# Patient Record
Sex: FEMALE | Race: BLACK OR AFRICAN AMERICAN | NOT HISPANIC OR LATINO
[De-identification: names, ages, dates, MRNs, and addresses within clinical notes are randomized per-mention and may not be internally consistent; named-entity substitution may affect disease eponyms.]

---

## 2017-01-21 ENCOUNTER — HOSPITAL (OUTPATIENT)
Dept: OTHER | Age: 32
End: 2017-01-21
Attending: NURSE PRACTITIONER

## 2017-01-21 LAB
C1-ESTERASE INHIBITOR (C1EINH): 28
C3 SERPL-MCNC: 164 MG/DL (ref 79–152)
C4 SERPL-MCNC: 40.1 MG/DL (ref 16–38)
ERYTHROCYTE [SEDIMENTATION RATE] IN BLOOD BY WESTERGREN METHOD: 46 MM/HR (ref 0–20)
TRYPTASE SERPL-MCNC: 3.5 NG/ML

## 2018-05-14 PROBLEM — O09.299 HISTORY OF FETAL ANOMALY IN PRIOR PREGNANCY, CURRENTLY PREGNANT (HCC): Status: ACTIVE | Noted: 2018-05-14

## 2018-10-23 PROBLEM — O99.820 GROUP B STREPTOCOCCUS CARRIER, +RV CULTURE, CURRENTLY PREGNANT (HCC): Status: ACTIVE | Noted: 2018-10-23

## 2018-11-19 PROBLEM — Z34.90 PREGNANCY (HCC): Status: ACTIVE | Noted: 2018-11-19

## 2018-11-20 PROBLEM — Z98.891 STATUS POST CESAREAN DELIVERY: Status: ACTIVE | Noted: 2018-11-20

## 2018-11-20 NOTE — ANESTHESIA PREPROCEDURE EVALUATION
PRE-OP EVALUATION    Patient Name: Himanshu Dwyer    Pre-op Diagnosis: * No pre-op diagnosis entered *    Procedure(s):      Surgeon(s) and Role:     Jackson Frankel MD - Primary    Pre-op vitals reviewed.   Temp: 99.2 °F (37.3 °C)  Pulse: 77  Resp: 18  BP: 119 (XYZAL ALLERGY 24HR) 5 MG Oral Tab Take 5 mg by mouth every evening. Disp:  Rfl:    Prenat w/o R-DL-Qccvyeo-FA-DHA (PNV-DHA OR) Take 1 tablet by mouth. Disp:  Rfl:        Allergies: Patient has no known allergies.       Anesthesia Evaluation    Patient summ

## 2018-11-20 NOTE — PROGRESS NOTES
Pt is a 29year old female admitted to 109/109-A. Patient presents with:  Scheduled Induction     Pt is  40w6d intra-uterine pregnancy. History obtained, consents signed. Oriented to room, staff, and plan of care.

## 2018-11-20 NOTE — PLAN OF CARE
Problem: Patient/Family Goals  Goal: Patient/Family Long Term Goal  Patient's Long Term Goal: Safe and uncomfortable.     Interventions:  - See additional Care Plan goals for specific interventions   Outcome: Progressing    Goal: Patient/Family Short Term G

## 2018-11-20 NOTE — ANESTHESIA POSTPROCEDURE EVALUATION
[address] Patient Status:  Inpatient   Age/Gender 29year old female MRN [id number]   Location [address] [facility] Attending Areli Mccullough MD   Hosp Day # 1 PCP No primary care provider on file.        Anesthesia Post-op

## 2018-11-20 NOTE — PROGRESS NOTES
Labor Progress Note    No complaints. Reports low back pain.   Temp:  [97.5 °F (36.4 °C)-99.8 °F (37.7 °C)] 99.2 °F (37.3 °C)  Pulse:  [] 80  Resp:  [18] 18  BP: (108-144)/(55-83) 121/64  FHT:  baseline 140s, moderate variability, accels appreciated,

## 2018-11-20 NOTE — OPERATIVE REPORT
Operative Note    Preop diagnosis: 41w0d     Failure to progress  Postop diagnosis: Same  Procedure:  Primary low transverse  section  Surgeon:  Karina Rose  Assistant:  Geeta Carter  Anesthesia:  Epidural  Findings:  Male infant weighing 9#2 with Apgars 8 an The uterine incision was closed with #1 chromic in a running-locking fashion. A second layer was imbricated using #1 chromic. Additional figure of eight sutures were placed for hemostasis. Good hemostasis was confirmed at the level of the uterus.  The pelvi

## 2018-11-20 NOTE — H&P
OB H&P    34yo  at 40w6d admitted for IOL. She denies contractions, no lof, no vb, +FM. Prenatal course complicated by 81BI D&E for multiple fetal anomalies. GBS is positive. She has had low risk cffDNA and normal level 2 US this pregnancy. in AM  Start antibiotics for GBS prophylaxis in labor  FWBR  Anticipate     [facility]

## 2018-11-20 NOTE — PROGRESS NOTES
[facility]      Labor Progress Note    Jessica Palomares Patient Status:  Inpatient    1985 MRN [id number]   Location 1818 [facility] Attending Kathy Morelos MD   Hosp Day # 1 PCP No primary care provider on file.       SUBJECTIVE:

## 2018-11-20 NOTE — PROGRESS NOTES
Labor Progress Note    No new complaints.   Temp:  [97.5 °F (36.4 °C)-99.8 °F (37.7 °C)] 99.2 °F (37.3 °C)  Pulse:  [] 77  Resp:  [18] 18  BP: (108-144)/(55-83) 119/57  FHT:  baseline 140s, moderate variability, accels appreciated, variable decels not

## 2018-11-20 NOTE — PROGRESS NOTES
Addendum:    Pt with temp of 102.4 in recovery room. Will send placenta. Treat for chorio with amp/gent/clinda.

## 2018-11-20 NOTE — PROGRESS NOTES
[facility]      Labor Progress Note    Moris Eric Patient Status:  Inpatient    1985 MRN [id number]   Location 1818 [facility] Attending Red Bradley MD   Hosp Day # 1 PCP No primary care provider on file.       SUBJECTIVE:

## 2018-11-21 NOTE — PROGRESS NOTES
Postpartum Progress Note    SUBJECTIVE:    Post-op day #1 s/p primary  section. Antibiotics for chorio. No overnight events. No complaints. Has been out of bed, tolerating PO, delacruz in place, + flatus. Breastfeeding.       OBJECTIVE:    Vital

## 2018-11-21 NOTE — PROGRESS NOTES
[facility]    Patients Name: Himanshu Dwyer  Attending Physician: Ish Mccoy MD  CSN: [id number]    Location:  1116/1116-A  MRN: [id number]    YOB: 1985  Admission Date: 11/19/2018     Obstetric Anesthesia Pain Progress Note    Post-Op

## 2018-11-21 NOTE — PROGRESS NOTES
NURSING ADMISSION NOTE      Patient admitted via Cart  Oriented to room. Safety precautions initiated. Bed in low position. Call light in reach.  Stable, baby with mom , ID bands match, Hug and kiss in place

## 2018-11-22 NOTE — PROGRESS NOTES
Postop Day 2    Pt without complaints.      Temp: 98.7 °F (37.1 °C)  Pulse: 97  Resp: 20  BP: 125/66  abd  soft, NT, ND, fundus firm below umbilicus, incision C/D/I  extr  trace edema, no calf tenderness    Impression: POD#2  Plan:  Tmax 99 last night, cont

## 2018-11-23 NOTE — PROGRESS NOTES
Patient complaints: none. Feels well. Vital signs reviewed    Examination:  General: alert, appropriate affect  Abdomen: Fundus firm and no unexpected tenderness.   Remainder of abdomen unremarkable  Incision: dry, intact, no unexpected findings  Lochia

## 2018-11-23 NOTE — DISCHARGE SUMMARY
Reason for Admission: pregnancy      Hospital Course:   followed by uncomplicated postop course    Complications: none    Disposition: Home or Self Care    Discharge Condition: Good    Discharge Medications: norco, motrin    Follow up Visits:  Yusef Duran

## 2024-02-28 ENCOUNTER — LAB ENCOUNTER (OUTPATIENT)
Dept: LAB | Facility: HOSPITAL | Age: 39
End: 2024-02-28
Attending: INTERNAL MEDICINE
Payer: COMMERCIAL

## 2024-02-28 DIAGNOSIS — R10.84 ABDOMINAL DISCOMFORT, GENERALIZED: ICD-10-CM

## 2024-02-28 DIAGNOSIS — D64.9 ANEMIA, UNSPECIFIED TYPE: ICD-10-CM

## 2024-02-28 DIAGNOSIS — R19.4 ALTERED BOWEL HABITS: ICD-10-CM

## 2024-02-28 LAB
ALBUMIN SERPL-MCNC: 4.1 G/DL (ref 3.4–5)
ALBUMIN/GLOB SERPL: 1 {RATIO} (ref 1–2)
ALP LIVER SERPL-CCNC: 61 U/L
ALT SERPL-CCNC: 14 U/L
ANION GAP SERPL CALC-SCNC: 4 MMOL/L (ref 0–18)
AST SERPL-CCNC: 14 U/L (ref 15–37)
BASOPHILS # BLD AUTO: 0.04 X10(3) UL (ref 0–0.2)
BASOPHILS NFR BLD AUTO: 0.4 %
BILIRUB SERPL-MCNC: 0.5 MG/DL (ref 0.1–2)
BUN BLD-MCNC: 8 MG/DL (ref 9–23)
CALCIUM BLD-MCNC: 9.4 MG/DL (ref 8.5–10.1)
CHLORIDE SERPL-SCNC: 105 MMOL/L (ref 98–112)
CO2 SERPL-SCNC: 27 MMOL/L (ref 21–32)
CREAT BLD-MCNC: 0.99 MG/DL
CRP SERPL-MCNC: 0.88 MG/DL (ref ?–0.3)
EGFRCR SERPLBLD CKD-EPI 2021: 75 ML/MIN/1.73M2 (ref 60–?)
EOSINOPHIL # BLD AUTO: 0.12 X10(3) UL (ref 0–0.7)
EOSINOPHIL NFR BLD AUTO: 1.2 %
ERYTHROCYTE [DISTWIDTH] IN BLOOD BY AUTOMATED COUNT: 12.5 %
FASTING STATUS PATIENT QL REPORTED: NO
GLOBULIN PLAS-MCNC: 4.1 G/DL (ref 2.8–4.4)
GLUCOSE BLD-MCNC: 115 MG/DL (ref 70–99)
HCT VFR BLD AUTO: 35.8 %
HGB BLD-MCNC: 11.8 G/DL
IGA SERPL-MCNC: 127 MG/DL (ref 70–312)
IMM GRANULOCYTES # BLD AUTO: 0.01 X10(3) UL (ref 0–1)
IMM GRANULOCYTES NFR BLD: 0.1 %
LYMPHOCYTES # BLD AUTO: 4.23 X10(3) UL (ref 1–4)
LYMPHOCYTES NFR BLD AUTO: 43.7 %
MCH RBC QN AUTO: 28.4 PG (ref 26–34)
MCHC RBC AUTO-ENTMCNC: 33 G/DL (ref 31–37)
MCV RBC AUTO: 86.1 FL
MONOCYTES # BLD AUTO: 0.75 X10(3) UL (ref 0.1–1)
MONOCYTES NFR BLD AUTO: 7.8 %
NEUTROPHILS # BLD AUTO: 4.52 X10 (3) UL (ref 1.5–7.7)
NEUTROPHILS # BLD AUTO: 4.52 X10(3) UL (ref 1.5–7.7)
NEUTROPHILS NFR BLD AUTO: 46.8 %
OSMOLALITY SERPL CALC.SUM OF ELEC: 281 MOSM/KG (ref 275–295)
PLATELET # BLD AUTO: 331 10(3)UL (ref 150–450)
POTASSIUM SERPL-SCNC: 3.8 MMOL/L (ref 3.5–5.1)
PROT SERPL-MCNC: 8.2 G/DL (ref 6.4–8.2)
RBC # BLD AUTO: 4.16 X10(6)UL
SODIUM SERPL-SCNC: 136 MMOL/L (ref 136–145)
TSI SER-ACNC: 2.93 MIU/ML (ref 0.36–3.74)
WBC # BLD AUTO: 9.7 X10(3) UL (ref 4–11)

## 2024-02-28 PROCEDURE — 85025 COMPLETE CBC W/AUTO DIFF WBC: CPT

## 2024-02-28 PROCEDURE — 36415 COLL VENOUS BLD VENIPUNCTURE: CPT

## 2024-02-28 PROCEDURE — 86364 TISS TRNSGLTMNASE EA IG CLAS: CPT

## 2024-02-28 PROCEDURE — 83540 ASSAY OF IRON: CPT

## 2024-02-28 PROCEDURE — 86140 C-REACTIVE PROTEIN: CPT

## 2024-02-28 PROCEDURE — 84443 ASSAY THYROID STIM HORMONE: CPT

## 2024-02-28 PROCEDURE — 83550 IRON BINDING TEST: CPT

## 2024-02-28 PROCEDURE — 82728 ASSAY OF FERRITIN: CPT

## 2024-02-28 PROCEDURE — 80053 COMPREHEN METABOLIC PANEL: CPT

## 2024-02-28 PROCEDURE — 82784 ASSAY IGA/IGD/IGG/IGM EACH: CPT

## 2024-02-29 ENCOUNTER — LAB ENCOUNTER (OUTPATIENT)
Dept: LAB | Facility: HOSPITAL | Age: 39
End: 2024-02-29
Attending: INTERNAL MEDICINE
Payer: COMMERCIAL

## 2024-02-29 DIAGNOSIS — R19.4 ALTERED BOWEL HABITS: ICD-10-CM

## 2024-02-29 DIAGNOSIS — R10.84 ABDOMINAL DISCOMFORT, GENERALIZED: ICD-10-CM

## 2024-02-29 LAB
DEPRECATED HBV CORE AB SER IA-ACNC: 14 NG/ML
IRON SATN MFR SERPL: 12 %
IRON SERPL-MCNC: 45 UG/DL
TIBC SERPL-MCNC: 377 UG/DL (ref 240–450)
TRANSFERRIN SERPL-MCNC: 253 MG/DL (ref 200–360)
TTG IGA SER-ACNC: 0.5 U/ML (ref ?–7)

## 2024-02-29 PROCEDURE — 83993 ASSAY FOR CALPROTECTIN FECAL: CPT

## 2024-03-04 LAB — CALPROTECTIN STL-MCNT: 11.8 ΜG/G (ref ?–50)

## (undated) NOTE — LETTER
[facility]  [address] 62 [address] [facility], [address] [place] [address]    Consent for Operation    Date: __________________    Time: _______________    1.  I authorize the performance upon Julieth Ramos the following operation:                              Primary Ike videotape. The [facility] will not be responsible for storage or maintenance of this tape. 6. For the purpose of advancing medical education, I consent to the admittance of observers to the Operating Room.     7. I authorize the use of any specimen, organs Signature of Patient:   ___________________________    When the patient is a minor or mentally incompetent to give consent:  Signature of person authorized to consent for patient: ___________________________   Relationship to patient: _____________________ 3. I understand how the anesthesia medicine will help me (benefits). 4. I understand that with any type of anesthesia medicine there are risks:  a.  The most common risks are: nausea, vomiting, sore throat, muscle soreness, damage to my eyes, mouth, or t _____________________________________________________________________________  Witness        Date   Time  I have verified that the signature is that of the patient or patient’s representative, and that it was signed before the procedure    Page 2 of 2

## (undated) NOTE — LETTER
[facility]  Guz[address] Thomas 62 [address] [facility], [address] [place] [address]    Consent for Operation    Date: __________________    Time: _______________    1.  I authorize the performance upon Jim Enriquez the following operation:                              Primary Ike videotape. [facility] will not be responsible for storage or maintenance of this tape. 6. For the purpose of advancing medical education, I consent to the admittance of observers to the Operating Room.     7. I authorize the use of any specimen, organs Signature of Patient:   ___________________________    When the patient is a minor or mentally incompetent to give consent:  Signature of person authorized to consent for patient: ___________________________   Relationship to patient: _____________________ 3. I understand how the anesthesia medicine will help me (benefits). 4. I understand that with any type of anesthesia medicine there are risks:  a.  The most common risks are: nausea, vomiting, sore throat, muscle soreness, damage to my eyes, mouth, or t _____________________________________________________________________________  Witness        Date   Time  I have verified that the signature is that of the patient or patient’s representative, and that it was signed before the procedure    Page 2 of 2